# Patient Record
Sex: FEMALE | Race: AMERICAN INDIAN OR ALASKA NATIVE
[De-identification: names, ages, dates, MRNs, and addresses within clinical notes are randomized per-mention and may not be internally consistent; named-entity substitution may affect disease eponyms.]

---

## 2019-04-08 ENCOUNTER — HOSPITAL ENCOUNTER (INPATIENT)
Dept: HOSPITAL 5 - LD | Age: 29
LOS: 1 days | Discharge: HOME | End: 2019-04-09
Attending: OBSTETRICS & GYNECOLOGY | Admitting: OBSTETRICS & GYNECOLOGY
Payer: MEDICAID

## 2019-04-08 DIAGNOSIS — Z23: ICD-10-CM

## 2019-04-08 DIAGNOSIS — Z88.6: ICD-10-CM

## 2019-04-08 DIAGNOSIS — Z3A.41: ICD-10-CM

## 2019-04-08 LAB
HCT VFR BLD CALC: 34.9 % (ref 30.3–42.9)
HGB BLD-MCNC: 11.7 GM/DL (ref 10.1–14.3)
MCHC RBC AUTO-ENTMCNC: 33 % (ref 30–34)
MCV RBC AUTO: 95 FL (ref 79–97)
PLATELET # BLD: 248 K/MM3 (ref 140–440)
RBC # BLD AUTO: 3.66 M/MM3 (ref 3.65–5.03)

## 2019-04-08 PROCEDURE — 36415 COLL VENOUS BLD VENIPUNCTURE: CPT

## 2019-04-08 PROCEDURE — 96360 HYDRATION IV INFUSION INIT: CPT

## 2019-04-08 PROCEDURE — 86592 SYPHILIS TEST NON-TREP QUAL: CPT

## 2019-04-08 PROCEDURE — 3E033VJ INTRODUCTION OF OTHER HORMONE INTO PERIPHERAL VEIN, PERCUTANEOUS APPROACH: ICD-10-PCS | Performed by: OBSTETRICS & GYNECOLOGY

## 2019-04-08 PROCEDURE — 96374 THER/PROPH/DIAG INJ IV PUSH: CPT

## 2019-04-08 PROCEDURE — 86900 BLOOD TYPING SEROLOGIC ABO: CPT

## 2019-04-08 PROCEDURE — 86850 RBC ANTIBODY SCREEN: CPT

## 2019-04-08 PROCEDURE — 59025 FETAL NON-STRESS TEST: CPT

## 2019-04-08 PROCEDURE — 96361 HYDRATE IV INFUSION ADD-ON: CPT

## 2019-04-08 PROCEDURE — 85018 HEMOGLOBIN: CPT

## 2019-04-08 PROCEDURE — 96372 THER/PROPH/DIAG INJ SC/IM: CPT

## 2019-04-08 PROCEDURE — 86901 BLOOD TYPING SEROLOGIC RH(D): CPT

## 2019-04-08 PROCEDURE — 85027 COMPLETE CBC AUTOMATED: CPT

## 2019-04-08 PROCEDURE — 0UQC0ZZ REPAIR CERVIX, OPEN APPROACH: ICD-10-PCS | Performed by: OBSTETRICS & GYNECOLOGY

## 2019-04-08 PROCEDURE — 85014 HEMATOCRIT: CPT

## 2019-04-08 PROCEDURE — 96365 THER/PROPH/DIAG IV INF INIT: CPT

## 2019-04-08 RX ADMIN — FENTANYL CITRATE PRN MCG: 50 INJECTION, SOLUTION INTRAMUSCULAR; INTRAVENOUS at 11:51

## 2019-04-08 RX ADMIN — FENTANYL CITRATE PRN MCG: 50 INJECTION, SOLUTION INTRAMUSCULAR; INTRAVENOUS at 14:23

## 2019-04-08 RX ADMIN — METHYLERGONOVINE MALEATE SCH MG: 0.2 TABLET ORAL at 22:12

## 2019-04-08 RX ADMIN — DOCUSATE SODIUM SCH MG: 100 CAPSULE, LIQUID FILLED ORAL at 22:12

## 2019-04-08 RX ADMIN — HYDROCODONE BITARTRATE AND ACETAMINOPHEN PRN EACH: 5; 325 TABLET ORAL at 19:43

## 2019-04-08 NOTE — PROCEDURE NOTE
OB Delivery Note





- Delivery


Date of Delivery: 19


Assistant: CLAYTON ZELAYA


Estimated blood loss: 500cc





- Vaginal


Delivery presentation: vertex


Delivery position: OA


Intrapartum events: other(please specify) (PP bleeding due to cervical 

laceration)


Delivery induction: oxytocin


Delivery augmentation: rupture of membranes


Delivery monitor: external uterine, internal FHT


Route of delivery: 


Delivery placenta: spontaneous


Delivery cord: 3 umbilical vessels


Episiotomy: none


Delivery laceration: other (cervical lac repaired with 2-0 vicryl)


Delivery repair: vicryl


Anesthesia: local


Delivery comments: 


 live born female over intact perineum NICU called to delivery  Baby passed 

to waiting NICU team. Cord blood obtained Placenta and membrane delivered 

complete and intact, 3 vessel cord. Pit IVFs Blood clots noted methergine IM 

given Cytotec 800mcg LA Apgar 8/8, , wgt 9-6.


Cervical tear noted. Lidocaine infused Tear repaired with 2-0 vicryl in usual 

fashion. Vaginal packing placed. Will remove in 4 hours. Mom and baby remain LDR

stable.


Methergine po ordered PP to start @ 2200.








- Infant


  ** A


Apgar at 1 minute: 8


Apgar at 5 minutes: 8


Infant Gender: Female (wgt 9-6)

## 2019-04-08 NOTE — HISTORY AND PHYSICAL REPORT
History of Present Illness


Date of examination: 19 (presents for IOL)


Date of admission: 


19 08:13





History of present illness: 


EDC Confirmation:  2019


Gestational Age:  26 5/7 weeks





Past Pregnancy History 


   :      5


   Term Births:      2


   Premature Births:   1


   Living Children:   3


   Para:         3


   Mult. Births:      0


   Prev :   0


   Prev.  attempt?   0


   Aborta:      1


   Elect. Ab:      0


   Spont. Ab:      1


   Ectopics:      0





Pregnancy # 1


   Delivery date:     


   Weeks Gestation:   40


   Delivery type:     


   Anesthesia type:     epidural


   Delivery location:      [facility]


   Birth weight:      6lb


   Comments:      pt reports all babies were 6lbs





Pregnancy # 2


   Delivery date:     2015


   Weeks Gestation:   6


   Delivery type:     SAB


   Delivery location:     [facility]


   Comments:      pyleostenosis





Pregnancy # 3


   Delivery date:     2017


   Weeks Gestation:   40


   Delivery type:     


   Anesthesia type:     epidural


   Delivery location:     [facility]


   Birth weight:      6lb





Pregnancy # 4


   Delivery date:     2018


   Weeks Gestation:   34


    labor:     yes


   Delivery type:     


   Anesthesia type:     epidural


   Delivery location:     [facility]


   Birth weight:      6lb








Past Medical History:


   Reviewed history and no changes required:


      Negative Past Medical History





Past Surgical History:


   Reviewed history and no changes required:


      negative





Past Medical History 


Anesthesia Complications: negative


Anemia: negative


Autoimmune Disorder: negative


Bleeding Disorder: negative


Blood Transfusions: negative


Breast Disease: negative


Diabetes: negative


Heart Disease: negative


Hypertension: negative


Hepatitis/Liver Disease: negative


Kidney Disease/UTI: negative


Neurologic/Epilepsy/Migraines: negative


Phlebitis/Varicosities: negative


Psychiatric: negative


Pulmonary Disease/Asthma: negative


Thyroid Disease: negative


Hospitalizations: negative


Surgery (Non-gyn): negative


Abnormal PAP: negative


CHELI Exposure: negative


Infertility: negative


Uterine Anomaly: negative


Uterine Surgery (not C/S): negative


Other Gynecologic Problems: negative





Social Hx: Patient is single





Smoking History:


Patient has never smoked.








Infection History 


Hx of STD: none


Personal hx. of genital herpes: no


Partner hx. of genital herpes: no


Varicella/Chicken Pox Status: Previous Disease





Genetic History 


 Congenital Heart Defect:


    Mom: no  Dad: no


Canavan Disease:


    Mom: no  Dad: no


Thalassemia


    Mom: no  Dad: no


Neural Tube Defect


    Mom: no  Dad: no


Down's Syndrome


    Mom: no  Dad: no


Jerry-Sachs


    Mom: no  Dad: no


Sickle Cell Disease/Trait


    Mom: no  Dad: no


Hemophilia


    Mom: no  Dad: no


Muscular Dystrophy


    Mom: no  Dad: no


Cystic Fibrosis


    Mom: no  Dad: no


Billy Chorea


    Mom: no  Dad: no


Mental Retardation


    Mom: no  Dad: no


Fragile X


    Mom: no  Dad: no


Other Genetic/Chromosomal Disorder


    Mom: no  Dad: no


Child w/other birth defect


    Mom: no  Dad: no





Enviromental Exposures 


Xray Exposure: no


Medication, drug, or alcohol use since LMP: no


Chemical/Other Exposure: no


Exposure to Cat Liter: no


Hx of Parvovirus (Fifth Disease): no


Occupational Exposure to Children: none


Active Medications (reviewed today):


None





Current Allergies (reviewed today):


IBUPROFEN (IBUPROFEN TABS) (Critical)








Past History





- Obstetrical History


Expected Date of Delivery: 19


Actual Gestation: 41 Week(s) 2 Day(s) 


: 5


Para: 2


Hx # Term Pregnancies: 2


Number of  Pregnancies: 0


Spontaneous Abortions: 0


Induced : 0


Number of Living Children: 2





Medications and Allergies


                                    Allergies











Allergy/AdvReac Type Severity Reaction Status Date / Time


 


ibuprofen Allergy  Swelling Verified 19 10:21











Active Meds: 


Active Medications





Ephedrine Sulfate (Ephedrine Sulfate)  10 mg IV Q2M PRN


   PRN Reason: Hypotension


Fentanyl (Sublimaze)  100 mcg IV Q2H PRN


   PRN Reason: Labor Pain


Lactated Ringer's (Lactated Ringers)  1,000 mls @ 125 mls/hr IV AS DIRECT DELORES


Oxytocin/Sodium Chloride (Pitocin/Ns 20 Unit/1000ml Drip)  20 units in 1,000 mls

@ 125 mls/hr IV AS DIRECT DELORES


Lactated Ringer's (Lactated Ringers)  1,000 mls @ 125 mls/hr IV AS DIRECT DELORES


Oxytocin/Sodium Chloride (Pitocin/Ns 30 Unit/500ml)  30 units in 500 mls @ 4 

mls/hr IV Q30MIN DELORES; Protocol


Mineral Oil (Mineral Oil)  30 ml PO QHS PRN


   PRN Reason: Constipation


Ondansetron HCl (Zofran)  4 mg IV Q8H PRN


   PRN Reason: Nausea And Vomiting


Terbutaline Sulfate (Brethine)  0.25 mg SUB-Q ONCE PRN


   PRN Reason: Hyperstimulation/Hypertonicity


Terbutaline Sulfate (Brethine)  0.25 mg IVP ONCE PRN


   PRN Reason: Hyperstimulation/Hypertonicity











- Vital Signs


Vital signs: 


                                   Vital Signs











Pulse BP


 


 86   111/73 


 


 19 08:37  19 08:37








                                        











Temp Pulse Resp BP Pulse Ox


 


 98.2 F   86   16   111/73    


 


 19 09:00  19 08:37  19 09:00  19 08:37   














- Physical Exam


Breasts: Positive: normal


Cardiovascular: Regular rate, Normal S1, Normal S2


Lungs: Positive: Normal air movement


Abdomen: Positive: normal appearance, soft, normal bowel sounds.  Negative: 

distention, tenderness


Genitourinary (Female): Positive: normal external genitalia


Vulva: both: normal


Vagina: Positive: normal moisture.  Negative: discharge


Cervix: Negative: lesion, discharge


Uterus: Positive: normal size, normal contour


Adnexa: both: normal


Anus/Rectum: Positive: normal perianal skin, heme negative.  Negative: rectal 

mass, hemorrhoids


Extremities: Positive: normal


Deep Tendon Reflex Grade: Normal +2





- Obstetrical


FHR: category 1


Uterine Contraction Monitor Mode: External


Cervical Dilatation: 6 (per RN on admission)


Cervical Effacement Percentage: 70


Fetal station: -2


Uterine Contraction Pattern: Irregular


Uterine Tone Measurement Phase: Resting


Uterine Contraction Intensity: Mild





Results


Result Diagrams: 


                                 19 09:00





                              Abnormal lab results











  19 Range/Units





  09:00 


 


RDW  15.9 H  (13.2-15.2)  %








All other labs normal.








GBS Negative





HBsAg Screen              Negative                    Negative         *1


  RPR                       Non Reactive                Non Reactive     *2


 Rubella Antibodies, IgG


                            13.50 index                 Immune >0.99     *3


                                    Non-immune       <0.90


                                Equivocal  0.90 - 0.99


                                Immune           >0.99


   


  ABO Grouping              O                                            *4


  Rh Factor                 Positive                                     *5


    Please note: Prior records for this patient's ABO / Rh type are not


available for additional verification.


   


  Antibody Screen           Negative                    Negative         *6


  WBC                       7.6 x10E3/uL                3.4-10.8         *7


  RBC                  [L]  3.59 x10E6/uL               3.77-5.28        *8


  Hemoglobin           [L]  10.5 g/dL                   11.1-15.9        *9


  Hematocrit           [L]  32.1 %                      34.0-46.6        *10


  MCV                       89 fL                       79-97            *11


  MCH                       29.2 pg                     26.6-33.0        *12


  MCHC                      32.7 g/dL                   31.5-35.7        *13


  RDW                  [H]  21.6 %                      12.3-15.4        *14


  Platelets                 224 x10E3/uL                150-379          *15


  Neutrophils               73 %                        Not Estab.       *16


  Lymphs                    20 %                        Not Estab.       *17


  Monocytes                 6 %                         Not Estab.       *18


  Eos                       1 %                         Not Estab.       *19


  Basos                     0 %                         Not Estab.       *20


! Immature Cells            <No Reported Value>                          *21


 Neutrophils (Absolute)


                            5.5 x10E3/uL                1.4-7.0          *22


  Lymphs (Absolute)         1.5 x10E3/uL                0.7-3.1          *23


  Monocytes(Absolute)       0.5 x10E3/uL                0.1-0.9          *24


  Eos (Absolute)            0.1 x10E3/uL                0.0-0.4          *25


  Baso (Absolute)           0.0 x10E3/uL                0.0-0.2          *26


! Immature Granulocytes


                            0 %                         Not Estab.       *27


! Immature Grans (Abs)      0.0 x10E3/uL                0.0-0.1          *28


! NRBC                      <No Reported Value>                          *29


  Hematology Comments:      <No Reported Value>                          *30





Tests: (2) Ct, Ng, Trich vag by KALYAN (907489)


! Chlamydia by KALYAN          Negative                    Negative         *31


! Gonococcus by KALYNA         Negative                    Negative         *32


! Trich vag by KALYAN          Negative                    Negative         *33





Tests: (3) Panel 726960 ([id number])


 HIV Screen 4th Generation wRfx


                            Non Reactive                Non Reactive     *34





Tests: (4) Gest. Diabetes 1-Hr Screen ([id number])


! Gestational Diabetes Screen


                            110 mg/dL                              *35


    According to ADA, a glucose threshold of >139 mg/dL after 50-gram


load identifies approximately 80% of women with gestational


diabetes mellitus, while the sensitivity is further increased to


approximately 90% by a threshold of >129 mg/dL.


   





Tests: (5) HCV Ab w/Rflx to Verification ([id number])


! HCV Ab                    <0.1 s/co ratio             0.0-0.9          *36





Tests: (6) Comment: ([id number])


! Comment:                  SPRCS                                        *37


    Non reactive HCV antibody screen is consistent with no HCV infection,


unless recent infection is suspected or other evidence exists to


indicate HCV infection.


   





Tests: (7) Urine Culture, Routine ([id number])


 Urine Culture, Routine


                            Final report                                 *38





Tests: (8) Result ([id number])


! Result 1                  No growth                                    *39





Assessment and Plan


29yo  @ 41 weeks for IOL GBS negative Orders in EMR

## 2019-04-09 VITALS — SYSTOLIC BLOOD PRESSURE: 114 MMHG | DIASTOLIC BLOOD PRESSURE: 71 MMHG

## 2019-04-09 LAB
HCT VFR BLD CALC: 29.2 % (ref 30.3–42.9)
HGB BLD-MCNC: 10.1 GM/DL (ref 10.1–14.3)

## 2019-04-09 PROCEDURE — 3E0234Z INTRODUCTION OF SERUM, TOXOID AND VACCINE INTO MUSCLE, PERCUTANEOUS APPROACH: ICD-10-PCS | Performed by: OBSTETRICS & GYNECOLOGY

## 2019-04-09 RX ADMIN — HYDROCODONE BITARTRATE AND ACETAMINOPHEN PRN EACH: 5; 325 TABLET ORAL at 09:08

## 2019-04-09 RX ADMIN — HYDROCODONE BITARTRATE AND ACETAMINOPHEN PRN EACH: 5; 325 TABLET ORAL at 01:45

## 2019-04-09 RX ADMIN — HYDROCODONE BITARTRATE AND ACETAMINOPHEN PRN EACH: 5; 325 TABLET ORAL at 16:43

## 2019-04-09 RX ADMIN — DOCUSATE SODIUM SCH MG: 100 CAPSULE, LIQUID FILLED ORAL at 09:10

## 2019-04-09 RX ADMIN — METHYLERGONOVINE MALEATE SCH MG: 0.2 TABLET ORAL at 08:10

## 2019-04-09 NOTE — DISCHARGE SUMMARY
Providers





- Providers


Date of Admission: 


19 08:13





Date of discharge: 19 (pt desires d/c home)


Attending physician: 


NEGIN MONTANA





Primary care physician: 


NEGIN MONTANA








Hospitalization


Reason for admission: labor


Condition: Good


Pertinent studies: 


post del H&H 10.29.2





Procedures: 


  w/ cervical laceration repair





Hospital course: 


  complicated by cervical laceration, postpartum course uncomplicated





Disposition: DC-01 TO HOME OR SELFCARE





- Discharge Diagnoses


(1) Cervical tear resulting from childbirth


Status: Acute   





(2) Normal spontaneous vaginal delivery


Status: Acute   





Core Measure Documentation





- Palliative Care


Palliative Care/ Comfort Measures: Not Applicable





- Core Measures


Any of the following diagnoses?: none





Exam





- Constitutional


Vitals: 


                                        











Temp Pulse Resp BP Pulse Ox


 


 98.5 F   74   20   107/57   96 


 


 19 04:49  19 04:49  19 04:49  19 04:49  19 04:49











General appearance: Present: no acute distress, well-nourished





- EENT


Eyes: Present: PERRL


ENT: hearing intact, clear oral mucosa





- Neck


Neck: Present: supple, normal ROM





- Respiratory


Respiratory effort: normal


Respiratory: bilateral: CTA





- Cardiovascular


Heart Sounds: Present: S1 & S2.  Absent: rub, click





- Extremities


Extremities: pulses symmetrical, No edema


Peripheral Pulses: within normal limits





- Abdominal


General gastrointestinal: Present: soft, non-tender, non-distended, normal bowel

sounds


Female genitourinary: Present: normal





- Integumentary


Integumentary: Present: clear, warm, dry





- Musculoskeletal


Musculoskeletal: gait normal, strength equal bilaterally





- Psychiatric


Psychiatric: appropriate mood/affect, intact judgment & insight





- Neurologic


Neurologic: CNII-XII intact, moves all extremities





- Additional findings


Additional findings: 


Lochia scant, fundus firm, VSSAF, bottle feeding








Plan


Activity: no restrictions


Diet: regular


Follow up with: 


NEGIN MONTANA MD [Primary Care Provider] - 19 (Congratulations!  

Please call [phone] to schedule your postpartum appointment in 4 weeks. 

Call for any questions or concerns. )


Prescriptions: 


Ibuprofen [Motrin 800 MG tab] 800 mg PO Q8HR PRN #30 tablet


 PRN Reason: Pain

## 2019-10-23 ENCOUNTER — HOSPITAL ENCOUNTER (EMERGENCY)
Dept: HOSPITAL 5 - ED | Age: 29
Discharge: HOME | End: 2019-10-23
Payer: MEDICAID

## 2019-10-23 VITALS — DIASTOLIC BLOOD PRESSURE: 56 MMHG | SYSTOLIC BLOOD PRESSURE: 94 MMHG
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
## 2019-10-23 DIAGNOSIS — Z79.899: ICD-10-CM

## 2019-10-23 DIAGNOSIS — S70.12XA: ICD-10-CM

## 2019-10-23 DIAGNOSIS — S70.11XA: ICD-10-CM

## 2019-10-23 DIAGNOSIS — Z88.8: ICD-10-CM

## 2019-10-23 DIAGNOSIS — F17.200: ICD-10-CM
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
## 2019-10-23 DIAGNOSIS — S00.11XA: ICD-10-CM

## 2019-10-23 DIAGNOSIS — S00.12XA: Primary | ICD-10-CM
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
## 2019-10-23 DIAGNOSIS — S00.531A: ICD-10-CM
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
## 2019-10-23 LAB
ALBUMIN SERPL-MCNC: 4.5 G/DL (ref 3.9–5)
ALT SERPL-CCNC: 62 UNITS/L (ref 7–56)
BACTERIA #/AREA URNS HPF: (no result) /HPF
BASOPHILS # (AUTO): 0 K/MM3 (ref 0–0.1)
BASOPHILS NFR BLD AUTO: 0.3 % (ref 0–1.8)
BILIRUB UR QL STRIP: (no result)
BLOOD UR QL VISUAL: (no result)
BUN SERPL-MCNC: 5 MG/DL (ref 7–17)
BUN/CREAT SERPL: 8 %
CALCIUM SERPL-MCNC: 9 MG/DL (ref 8.4–10.2)
EOSINOPHIL # BLD AUTO: 0 K/MM3 (ref 0–0.4)
EOSINOPHIL NFR BLD AUTO: 0.2 % (ref 0–4.3)
HCT VFR BLD CALC: 44.3 % (ref 30.3–42.9)
HEMOLYSIS INDEX: 3
HGB BLD-MCNC: 14.5 GM/DL (ref 10.1–14.3)
LYMPHOCYTES # BLD AUTO: 1.4 K/MM3 (ref 1.2–5.4)
LYMPHOCYTES NFR BLD AUTO: 20.5 % (ref 13.4–35)
MCHC RBC AUTO-ENTMCNC: 33 % (ref 30–34)
MCV RBC AUTO: 85 FL (ref 79–97)
MONOCYTES # (AUTO): 0.7 K/MM3 (ref 0–0.8)
MONOCYTES % (AUTO): 9.7 % (ref 0–7.3)
PH UR STRIP: 6 [PH] (ref 5–7)
PLATELET # BLD: 324 K/MM3 (ref 140–440)
PROT UR STRIP-MCNC: (no result) MG/DL
RBC # BLD AUTO: 5.23 M/MM3 (ref 3.65–5.03)
RBC #/AREA URNS HPF: 4 /HPF (ref 0–6)
UROBILINOGEN UR-MCNC: < 2 MG/DL (ref ?–2)
WBC #/AREA URNS HPF: 6 /HPF (ref 0–6)

## 2019-10-23 PROCEDURE — 84703 CHORIONIC GONADOTROPIN ASSAY: CPT

## 2019-10-23 PROCEDURE — 80053 COMPREHEN METABOLIC PANEL: CPT

## 2019-10-23 PROCEDURE — 36415 COLL VENOUS BLD VENIPUNCTURE: CPT

## 2019-10-23 PROCEDURE — 85025 COMPLETE CBC W/AUTO DIFF WBC: CPT

## 2019-10-23 PROCEDURE — 70450 CT HEAD/BRAIN W/O DYE: CPT

## 2019-10-23 PROCEDURE — 81001 URINALYSIS AUTO W/SCOPE: CPT

## 2019-10-23 PROCEDURE — 70486 CT MAXILLOFACIAL W/O DYE: CPT

## 2019-10-23 PROCEDURE — 72125 CT NECK SPINE W/O DYE: CPT
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
## 2019-10-23 NOTE — CAT SCAN REPORT
CT head/brain wo con



INDICATION:

headache/facial trauma.



TECHNIQUE: 

Routine CT head without contrast. All CT scans at this location are performed using CT dose reduction
 for ALARA by means of automated exposure control.



COMPARISON: 

None.



FINDINGS:



BRAIN / INTRACRANIAL CONTENTS: No acute intercranial hemorrhage, brain edema, mass effect, or hydroce
phalus. Normal gray-white differentiation. No chronic infarct or focal atrophy. Normal brain volume a
nd ventricular/sulcal size for age.



CALVARIUM/SKULL BASE/CRANIOCERVICAL JUNCTION: No evidence of fracture.



ORBITS: No significant abnormality of visualized orbits.

SINUSES / MASTOIDS: No significant abnormality of visualized sinuses and mastoid air cells.



ADDITIONAL FINDINGS: There is a small extracranial scalp hematoma in the right parietal scalp. 



IMPRESSION:

1. No acute intracranial abnormality.

2. Extracranial scalp hematoma in the right parietal scalp. 



Signer Name: Kalyan Hernandez MD 

Signed: 10/23/2019 12:42 PM

 Workstation Name: [email or web address]